# Patient Record
Sex: FEMALE | URBAN - METROPOLITAN AREA
[De-identification: names, ages, dates, MRNs, and addresses within clinical notes are randomized per-mention and may not be internally consistent; named-entity substitution may affect disease eponyms.]

---

## 2022-02-07 ENCOUNTER — APPOINTMENT (RX ONLY)
Dept: URBAN - METROPOLITAN AREA CLINIC 48 | Facility: CLINIC | Age: 37
Setting detail: DERMATOLOGY
End: 2022-02-07

## 2022-02-07 DIAGNOSIS — D22 MELANOCYTIC NEVI: ICD-10-CM

## 2022-02-07 DIAGNOSIS — L21.8 OTHER SEBORRHEIC DERMATITIS: ICD-10-CM

## 2022-02-07 DIAGNOSIS — L81.4 OTHER MELANIN HYPERPIGMENTATION: ICD-10-CM

## 2022-02-07 PROBLEM — D22.5 MELANOCYTIC NEVI OF TRUNK: Status: ACTIVE | Noted: 2022-02-07

## 2022-02-07 PROCEDURE — ? SUNSCREEN TREATMENT REGIMEN

## 2022-02-07 PROCEDURE — ? PRESCRIPTION

## 2022-02-07 PROCEDURE — ? COUNSELING

## 2022-02-07 PROCEDURE — ? PRESCRIPTION MEDICATION MANAGEMENT

## 2022-02-07 RX ORDER — KETOCONAZOLE 20 MG/ML
SHAMPOO, SUSPENSION TOPICAL
Qty: 120 | Refills: 0 | Status: ERX | COMMUNITY
Start: 2022-02-07

## 2022-02-07 RX ORDER — CLOBETASOL PROPIONATE 0.5 MG/ML
SOLUTION TOPICAL BID
Qty: 50 | Refills: 0 | Status: ERX | COMMUNITY
Start: 2022-02-07

## 2022-02-07 RX ADMIN — CLOBETASOL PROPIONATE: 0.5 SOLUTION TOPICAL at 00:00

## 2022-02-07 RX ADMIN — KETOCONAZOLE: 20 SHAMPOO, SUSPENSION TOPICAL at 00:00

## 2022-02-07 ASSESSMENT — LOCATION DETAILED DESCRIPTION DERM
LOCATION DETAILED: LEFT SUPERIOR UPPER BACK
LOCATION DETAILED: INFERIOR THORACIC SPINE
LOCATION DETAILED: LEFT LATERAL SUPERIOR CHEST

## 2022-02-07 ASSESSMENT — LOCATION SIMPLE DESCRIPTION DERM
LOCATION SIMPLE: CHEST
LOCATION SIMPLE: UPPER BACK
LOCATION SIMPLE: LEFT UPPER BACK

## 2022-02-07 ASSESSMENT — LOCATION ZONE DERM: LOCATION ZONE: TRUNK

## 2022-02-07 NOTE — PROCEDURE: PRESCRIPTION MEDICATION MANAGEMENT
Render In Strict Bullet Format?: No
Initiate Treatment: Ketoconazole 2% shampoo TIWk\\nClobetasol 0.05% solution bid
Detail Level: Zone

## 2022-03-07 ENCOUNTER — APPOINTMENT (RX ONLY)
Dept: URBAN - METROPOLITAN AREA CLINIC 48 | Facility: CLINIC | Age: 37
Setting detail: DERMATOLOGY
End: 2022-03-07

## 2022-03-07 DIAGNOSIS — L21.8 OTHER SEBORRHEIC DERMATITIS: ICD-10-CM

## 2022-03-07 PROCEDURE — ? PRESCRIPTION

## 2022-03-07 PROCEDURE — ? COUNSELING

## 2022-03-07 RX ORDER — CLOBETASOL PROPIONATE 0.5 MG/ML
SOLUTION TOPICAL BID
Qty: 50 | Refills: 0 | Status: ERX

## 2022-03-07 RX ORDER — KETOCONAZOLE 20 MG/ML
SHAMPOO, SUSPENSION TOPICAL
Qty: 120 | Refills: 0 | Status: ERX

## 2022-03-07 RX ORDER — CICLOPIROX 10 MG/.96ML
SHAMPOO TOPICAL
Qty: 120 | Refills: 0 | Status: ERX | COMMUNITY
Start: 2022-03-07

## 2022-03-07 RX ADMIN — CICLOPIROX: 10 SHAMPOO TOPICAL at 00:00
